# Patient Record
Sex: MALE | Race: ASIAN | Employment: OTHER | URBAN - METROPOLITAN AREA
[De-identification: names, ages, dates, MRNs, and addresses within clinical notes are randomized per-mention and may not be internally consistent; named-entity substitution may affect disease eponyms.]

---

## 2017-04-11 PROCEDURE — 84154 ASSAY OF PSA FREE: CPT | Performed by: UROLOGY

## 2017-04-11 PROCEDURE — 84153 ASSAY OF PSA TOTAL: CPT | Performed by: UROLOGY

## 2017-04-28 PROBLEM — R94.31 ABNORMAL EKG: Status: ACTIVE | Noted: 2017-04-28

## 2017-04-28 PROBLEM — Z98.61 H/O CORONARY ANGIOPLASTY: Status: ACTIVE | Noted: 2017-04-28

## 2017-05-18 PROBLEM — R94.39 ABNORMAL STRESS TEST: Status: ACTIVE | Noted: 2017-05-18

## 2017-05-30 ENCOUNTER — HOSPITAL ENCOUNTER (OUTPATIENT)
Dept: INTERVENTIONAL RADIOLOGY/VASCULAR | Facility: HOSPITAL | Age: 82
Discharge: HOME | End: 2017-05-30
Attending: INTERNAL MEDICINE | Admitting: INTERNAL MEDICINE
Payer: MEDICARE

## 2017-05-30 VITALS
HEIGHT: 64.5 IN | HEART RATE: 57 BPM | OXYGEN SATURATION: 98 % | WEIGHT: 127 LBS | TEMPERATURE: 98 F | DIASTOLIC BLOOD PRESSURE: 54 MMHG | BODY MASS INDEX: 21.42 KG/M2 | SYSTOLIC BLOOD PRESSURE: 112 MMHG | RESPIRATION RATE: 16 BRPM

## 2017-05-30 DIAGNOSIS — R93.1 ABNORMAL NUCLEAR CARDIAC IMAGING TEST: ICD-10-CM

## 2017-05-30 DIAGNOSIS — I25.10 CAD (CORONARY ARTERY DISEASE): ICD-10-CM

## 2017-05-30 PROCEDURE — B2111ZZ FLUOROSCOPY OF MULTIPLE CORONARY ARTERIES USING LOW OSMOLAR CONTRAST: ICD-10-PCS | Performed by: INTERNAL MEDICINE

## 2017-05-30 PROCEDURE — 99152 MOD SED SAME PHYS/QHP 5/>YRS: CPT

## 2017-05-30 PROCEDURE — 82962 GLUCOSE BLOOD TEST: CPT

## 2017-05-30 PROCEDURE — 93454 CORONARY ARTERY ANGIO S&I: CPT

## 2017-05-30 PROCEDURE — 99153 MOD SED SAME PHYS/QHP EA: CPT

## 2017-05-30 RX ORDER — LIDOCAINE HYDROCHLORIDE 10 MG/ML
INJECTION, SOLUTION INFILTRATION; PERINEURAL
Status: COMPLETED
Start: 2017-05-30 | End: 2017-05-30

## 2017-05-30 RX ORDER — ASPIRIN 81 MG/1
324 TABLET, CHEWABLE ORAL ONCE
Status: DISCONTINUED | OUTPATIENT
Start: 2017-05-30 | End: 2017-05-30

## 2017-05-30 RX ORDER — HEPARIN SODIUM 5000 [USP'U]/ML
INJECTION, SOLUTION INTRAVENOUS; SUBCUTANEOUS
Status: COMPLETED
Start: 2017-05-30 | End: 2017-05-30

## 2017-05-30 RX ORDER — SODIUM CHLORIDE 9 MG/ML
INJECTION, SOLUTION INTRAVENOUS CONTINUOUS
Status: DISCONTINUED | OUTPATIENT
Start: 2017-05-30 | End: 2017-05-30

## 2017-05-30 RX ORDER — METOPROLOL TARTRATE 5 MG/5ML
INJECTION INTRAVENOUS
Status: COMPLETED
Start: 2017-05-30 | End: 2017-05-30

## 2017-05-30 RX ORDER — MIDAZOLAM HYDROCHLORIDE 1 MG/ML
INJECTION INTRAMUSCULAR; INTRAVENOUS
Status: COMPLETED
Start: 2017-05-30 | End: 2017-05-30

## 2017-05-30 RX ADMIN — SODIUM CHLORIDE: 9 INJECTION, SOLUTION INTRAVENOUS at 11:45:00

## 2017-05-30 NOTE — PROCEDURES
[address] [facility]  Cardiac Catheterization Report    PREOPERATIVE DIAGNOSIS: Abnormal stress test.    POSTOPERATIVE DIAGNOSIS: Abnormal stress test.    PROCEDURE PERFORMED: Left heart catheterization with selective coronary arteriography, and was performed using the aforementioned catheters. The coronary ostia were noted to have normal origins from the aorta. A right common femoral arteriogram was performed via an injection of contrast through the sheath. The sheath was removed.  Hemostasis wa

## 2017-05-30 NOTE — H&P
The patient was seen and examined. There was no change in the patient's clinical status from the date of the last progress note, to the date of their procedure today at [facility]. Thus, the progress note is up-to-date.     Ammy Deleon MD, precordial  4/25/2014    •  Hypertension  4/25/2014              Past Surgical History      OTHER SURGICAL HISTORY          Comment  angioplasty      OTHER SURGICAL HISTORY          Comment  lt hernia       ANGIOPLASTY (CORONARY)          Comment  stent in Onset    •  Lipids  Father      •  Hypertension  Father           reports that he quit smoking about 17 years ago.  He has never used smokeless tobacco. He reports that he does not drink alcohol or use illicit drugs.       Allergies:  No Known Allergies  bleeding  Musculoskeletal:negative for myalgias  Neurological: negative for dizziness and headaches  Endocrine: negative for temperature intolerance        PHYSICAL EXAM:    /60 mmHg  Pulse 68  Resp 16  Ht 5' 4.5\" (1.638 m)  Wt 129 lb (58.514 kg)  B 16    09/19/2014  14    ----------  ALT (U/L)    Date  Value    04/11/2017  22    09/30/2016  20    09/23/2015  16*    ----------    GLUCOSE (mg/dL)    Date  Value    03/11/2016  120*    02/13/2015  140*    10/15/2014  111*    ----------  BUN (mg/dL)    Da with goal blood pressure less than 140/90: EXCELLENTLY CONTROLLED    Return in 6-8 weeks.     Jessica Meek MD, [facility]  5/18/2017  3:31 PM

## 2017-07-07 PROCEDURE — 87086 URINE CULTURE/COLONY COUNT: CPT | Performed by: INTERNAL MEDICINE

## 2022-12-17 NOTE — PROGRESS NOTES
Pt A/Ox4. REYNOLDS. R groin with dressing, CDI - soft, no hematoma, +pulses. VSS. Denies any pain. Voiding without difficulty. Off bedrest at 1500, ambulated in unit without complications. Discharge instructions given, pt verbalized understanding.
No

## (undated) NOTE — IP AVS SNAPSHOT
[facility] [place] [address] [place], [address] ~ [phone]                Discharge Summary   5/30/2017    [facility]           Admission Information        Provider Department    5/30/2017 Beverly Capone MD Eh Inte Take 1 tablet (2.5 mg total) by mouth once daily.     Flo Abbott     [    ]    [    ]    [    ]    [    ]       MetFORMIN HCl 1000 MG Tabs   Commonly known as:  GLUCOPHAGE        TAKE 1 TABLET BY MOUTH TWICE DAILY WITH MEALS    Osmany Schwartz Immunization History as of 5/30/2017  Never Reviewed    HEP A 3/15/2016, 4/17/2015    INFLUENZA 10/6/2016, 10/15/2015, 9/23/2014    Pneumococcal (Prevnar 13) 4/17/2015    Pneumovax 23 4/18/2017    TDAP 4/17/2015    TYPHOID 4/17/2015    Zoster (Shingles) 4/ coverage. Patient [address] is a Federal Navigator program that can help with your Affordable Care Act coverage, as well as all types of Medicaid plans.   To get signed up and covered, please call [phone] and ask to get set up for an insuran weakness, numbness           Cholesterol Lowering Medications     Colesevelam HCl (WELCHOL) 625 MG Oral Tab    Niacin ER, Antihyperlipidemic, 1000 MG Oral Tab CR    simvastatin 40 MG Oral Tab       Use: Lower cholesterol, protect your heart   Most common s